# Patient Record
Sex: FEMALE | URBAN - METROPOLITAN AREA
[De-identification: names, ages, dates, MRNs, and addresses within clinical notes are randomized per-mention and may not be internally consistent; named-entity substitution may affect disease eponyms.]

---

## 2020-08-17 ENCOUNTER — TRANSFERRED RECORDS (OUTPATIENT)
Dept: HEALTH INFORMATION MANAGEMENT | Facility: CLINIC | Age: 70
End: 2020-08-17

## 2024-08-01 ENCOUNTER — TRANSFERRED RECORDS (OUTPATIENT)
Dept: HEALTH INFORMATION MANAGEMENT | Facility: CLINIC | Age: 74
End: 2024-08-01

## 2024-08-28 ENCOUNTER — MEDICAL CORRESPONDENCE (OUTPATIENT)
Dept: HEALTH INFORMATION MANAGEMENT | Facility: CLINIC | Age: 74
End: 2024-08-28

## 2024-08-29 ENCOUNTER — TRANSCRIBE ORDERS (OUTPATIENT)
Dept: OTHER | Age: 74
End: 2024-08-29

## 2024-08-29 DIAGNOSIS — K83.4 SPHINCTER OF ODDI DYSFUNCTION: Primary | ICD-10-CM

## 2024-09-03 ENCOUNTER — TELEPHONE (OUTPATIENT)
Dept: GASTROENTEROLOGY | Facility: CLINIC | Age: 74
End: 2024-09-03

## 2024-09-03 NOTE — TELEPHONE ENCOUNTER
"[facility]     Referring provider: Dr Singh    Referred to: [facility]     Provider Requested: Dr. Huntley     Referral Received: 09/03/24       Records received: Media tab     Images received: none    Insurance Coverage: tbd    Evaluation for: SOD     Clinical History (per RN review):     Pt admitted w/ complication post ERCP:    GI CONSULT SERVICE PROGRESS NOTE       Subjective:   The patient is feeling better today.  Minimal abdominal pain.  Wants to get the NG tube out.     Objective:  BP (!) 157/87   Pulse 88   Temp 98.3  F (36.8  C)   Resp 18   Ht 5' 5\" (1.651 m)   Wt 81.1 kg (178 lb 11.2 oz)   SpO2 92%   BMI 29.74 kg/m     Weight: 81.1 kg (178 lb 11.2 oz)  Patient Vitals for the past 96 hrs:    Weight   09/02/24 0608 81.1 kg (178 lb 11.2 oz)         General: female in no apparent distress    HEENT: Sclera anicteric  Abdominal: The abdomen is non-distended with no peritoneal signs. The liver and spleen are not enlarged. No masses are palpable. Nontender, soft without rigidity or guarding  Extremities: No edema  Neuro: FRANCIS, alert and oriented  Psych: Appropriate mood and affect          Recent Labs     09/01/24  0736 09/02/24  0748   WBC 16.2* 12.5*   HEMOGLOBIN 11.2* 11.1*   MCV 89 89   INR 1.1  --               Recent Labs     09/01/24  0736 09/01/24  1550 09/02/24  0748 09/02/24  1900 09/03/24  0602   SODIUM 140  --  140  --   --    POTASSIUM 2.9*   < > 3.3* 3.4 3.6   CHLORIDE 110*  --  109*  --   --    CREATININE 0.54*  --  0.48*  --  0.57   ANIONGAP 6.0  --  6.0  --   --    GLUCOSE 104  --  146*  --   --     < > = values in this interval not displayed.          Recent Labs     09/01/24  0736   ALBUMIN 2.4*   ALTSGPT 26   ASTSGOT 22   ALKLNPTASE 119*   LIPASE 73*               Additional Comments:  I have reviewed the patient's medications                                       I have reviewed the patient's other test results including imaging                            "            Assessment:    74-year-old female with a history of sphincter of Oddi dysfunction, status post dual sphincterotomy in 2020 now with recurrent symptoms, status post ERCP where a biliary sphincterotomy was extended which was unfortunately complicated by perforation, 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  Pancreatic duct could not be accessed due to high-grade stenosis of the pancreatic sphincter.     Patient appears to be doing well, perforation was controlled at the time of the procedure with no fever, peritoneal signs and improving leukocytosis.     Recommendations:   Would recommend removing NG tube and advancing to clear liquid diet if okay from surgery perspective.  Patient will likely need follow-up imaging depending on symptoms.  The fluid collection noted on imaging at the time of admission may need to be drained in the future if symptomatic.  Continue antibiotics with gram negative coverage, for a 2 week course.  Will reschedule planned ERCP with stent removal for approximately 1 month from now.              ERCP 8-28-24    Indications:       74 y.o. with a history of cholecystectomy, papillary stenosis s/p dual        sphincterotomy in 2009 with resolution of symptoms, subsequently        recurred, s/p repeat ERCP with extension of both sphincterotomies in        2020 with resolution of symptoms, now with recurrence of abdominal pain,        likely due to restenosis. ERCP to treat likely stenotic biliary and        pancreatic sphincters.   Findings:       A  film of the abdomen was obtained. Surgical clips, consistent        with a previous cholecystectomy, were seen in the area of the right        upper quadrant of the abdomen. The esophagus was successfully intubated        under direct vision without detailed examination of the pharynx, larynx,        and associated structures, and upper GI tract. The upper GI tract  was        grossly normal. The major papilla was on the rim of a diverticulum. The        bile duct was deeply cannulated with the short-nosed traction        sphincterotome. Contrast was injected. The intra-hepatic and        extra-hepatic biliary duct system was normal. A 0.035 inch Acrobat wire        was passed into the biliary tree. The biliary sphincterotomy was        extended to a total of 12 mm in length with a monofilament traction        (standard) sphincterotome using ERBE electrocautery. There was no        post-sphincterotomy bleeding.        The pancreatic orifice appeared markedly stenotic. Attempts to access        the pancreatic duct with an 0.018 inch Roadrunner, 0.021 inch Tracer,        0.035 inch straight and angled Acrobat through both a papillotome and        5-4-3 cannula were unsuccessful.        The biliary sphincterotomy was large, and either a small maount of        subdiaphragmatic air vs. loop of bowel was seen in the RUQ. One 10 mm by        6 cm Viabil stent was placed 5 cm into the common bile duct. Bile flowed        through the stent. The stent was in good position.   Impression:       - Mild restenosis of the biliary sphincter. Sphincterotomy extended,        fully covered metal stent placed as a duodenal perforation could not be        excluded fluoroscopically, although was not visible endoscopically.        - Complete obstruction of the pancreatic sphincter, unable to access the        pancreatic duct despite multiple wires and devices.   Recommendation:       - Discharge home with antibiotics if pain is minimal after today's        procedure.        - Will discuss either referral to the [facility] for another        attempt at retrograde access or rendezvous approach, although the latter        is very high risk for pancreatitis in this setting.         MD review date:   MD Decision for clinic consultation/Orders:            Referral updates/Patient contacted:   [facility]  review- pt had complication post ERCP on 8/28, will continue to follow with [facility], referral to P rejected as ongoing care to be performed with Dr Singh.